# Patient Record
Sex: MALE | Race: WHITE | NOT HISPANIC OR LATINO | Employment: OTHER | URBAN - METROPOLITAN AREA
[De-identification: names, ages, dates, MRNs, and addresses within clinical notes are randomized per-mention and may not be internally consistent; named-entity substitution may affect disease eponyms.]

---

## 2020-12-18 ENCOUNTER — OFFICE VISIT (OUTPATIENT)
Dept: URGENT CARE | Facility: CLINIC | Age: 75
End: 2020-12-18
Payer: MEDICARE

## 2020-12-18 VITALS
OXYGEN SATURATION: 97 % | TEMPERATURE: 98 F | HEART RATE: 118 BPM | BODY MASS INDEX: 35.61 KG/M2 | WEIGHT: 235 LBS | HEIGHT: 68 IN | RESPIRATION RATE: 18 BRPM | SYSTOLIC BLOOD PRESSURE: 125 MMHG | DIASTOLIC BLOOD PRESSURE: 81 MMHG

## 2020-12-18 DIAGNOSIS — R50.9 FEVER, UNSPECIFIED FEVER CAUSE: Primary | ICD-10-CM

## 2020-12-18 LAB
CTP QC/QA: YES
SARS-COV-2 RDRP RESP QL NAA+PROBE: NEGATIVE

## 2020-12-18 PROCEDURE — 99203 PR OFFICE/OUTPT VISIT, NEW, LEVL III, 30-44 MIN: ICD-10-PCS | Mod: S$GLB,,, | Performed by: NURSE PRACTITIONER

## 2020-12-18 PROCEDURE — U0002 COVID-19 LAB TEST NON-CDC: HCPCS | Mod: QW,S$GLB,, | Performed by: NURSE PRACTITIONER

## 2020-12-18 PROCEDURE — 99203 OFFICE O/P NEW LOW 30 MIN: CPT | Mod: S$GLB,,, | Performed by: NURSE PRACTITIONER

## 2020-12-18 PROCEDURE — U0002: ICD-10-PCS | Mod: QW,S$GLB,, | Performed by: NURSE PRACTITIONER

## 2020-12-18 RX ORDER — CARVEDILOL 12.5 MG/1
TABLET ORAL
COMMUNITY
Start: 2020-10-20

## 2020-12-18 RX ORDER — PENICILLIN V POTASSIUM 500 MG/1
TABLET, FILM COATED ORAL
COMMUNITY
Start: 2020-12-18

## 2020-12-18 RX ORDER — AMLODIPINE BESYLATE 10 MG/1
TABLET ORAL
COMMUNITY
Start: 2020-10-27

## 2020-12-18 RX ORDER — LOSARTAN POTASSIUM 100 MG/1
TABLET ORAL
COMMUNITY
Start: 2020-10-27

## 2020-12-18 RX ORDER — ALLOPURINOL 300 MG/1
TABLET ORAL
COMMUNITY
Start: 2020-10-20

## 2020-12-18 RX ORDER — FINASTERIDE 5 MG/1
TABLET, FILM COATED ORAL
COMMUNITY
Start: 2020-11-19

## 2020-12-18 RX ORDER — HYDROCHLOROTHIAZIDE 12.5 MG/1
TABLET ORAL
COMMUNITY
Start: 2020-10-27

## 2020-12-18 RX ORDER — TAMSULOSIN HYDROCHLORIDE 0.4 MG/1
CAPSULE ORAL
COMMUNITY
Start: 2020-10-06

## 2020-12-18 RX ORDER — MUPIROCIN 20 MG/G
OINTMENT TOPICAL
COMMUNITY
Start: 2020-09-22

## 2020-12-18 NOTE — PROGRESS NOTES
"Subjective:       Patient ID: Zi Martin Jr. is a 75 y.o. male.    Vitals:  height is 5' 8" (1.727 m) and weight is 106.6 kg (235 lb). His temporal temperature is 97.7 °F (36.5 °C). His blood pressure is 125/81 and his pulse is 118 (abnormal). His respiration is 18 and oxygen saturation is 97%.     Chief Complaint: Fever    Patient just had recent dental work and he gets fevers from them.  States he has an antibiotic prescription waiting at the pharmacy, but wanted to rule out covid.      Fever   This is a new problem. The current episode started today. The problem occurs constantly. The problem has been gradually improving. The temperature was taken using an oral thermometer. Pertinent negatives include no congestion, coughing, ear pain, nausea, rash, sore throat, vomiting or wheezing. Treatments tried: penicillin 500mg  The treatment provided mild relief.       Constitution: Positive for chills and fever (100.5 today @ home). Negative for activity change, appetite change, sweating and fatigue.   HENT: Negative for ear pain, congestion, sinus pain, sinus pressure, sore throat and voice change.    Neck: Negative for painful lymph nodes.   Eyes: Negative for eye redness.   Respiratory: Negative for chest tightness, cough, sputum production, bloody sputum, COPD, shortness of breath, stridor, wheezing and asthma.    Gastrointestinal: Negative for nausea and vomiting.   Musculoskeletal: Negative for muscle ache.   Skin: Negative for rash.   Allergic/Immunologic: Negative for seasonal allergies and asthma.   Hematologic/Lymphatic: Negative for swollen lymph nodes.       Objective:      Physical Exam   Constitutional: He is oriented to person, place, and time. He appears well-developed. He is cooperative.  Non-toxic appearance. He does not appear ill. No distress.   HENT:   Head: Normocephalic and atraumatic.   Ears:   Right Ear: Hearing, tympanic membrane, external ear and ear canal normal.   Left Ear: Hearing, " tympanic membrane, external ear and ear canal normal.   Nose: No mucosal edema, rhinorrhea, purulent discharge, nasal deformity or congestion. No epistaxis. Right sinus exhibits no maxillary sinus tenderness and no frontal sinus tenderness. Left sinus exhibits no maxillary sinus tenderness and no frontal sinus tenderness.   Mouth/Throat: Uvula is midline, oropharynx is clear and moist and mucous membranes are normal. No oral lesions. No trismus in the jaw. Normal dentition. No uvula swelling or dental caries. No oropharyngeal exudate, posterior oropharyngeal edema or posterior oropharyngeal erythema.   Eyes: Conjunctivae and lids are normal. No scleral icterus.   Neck: Trachea normal, full passive range of motion without pain and phonation normal. Neck supple. No neck rigidity. No edema and no erythema present.   Cardiovascular: Normal rate, regular rhythm, normal heart sounds and normal pulses.   Pulmonary/Chest: Effort normal and breath sounds normal. No respiratory distress. He has no decreased breath sounds. He has no rhonchi.   Abdominal: Normal appearance.   Musculoskeletal: Normal range of motion.         General: No deformity.   Lymphadenopathy:     He has no cervical adenopathy.        Right cervical: No superficial cervical, no deep cervical and no posterior cervical adenopathy present.       Left cervical: No superficial cervical, no deep cervical and no posterior cervical adenopathy present.   Neurological: He is alert and oriented to person, place, and time. He exhibits normal muscle tone. Coordination normal.   Skin: Skin is warm, dry, intact, not diaphoretic and not pale. Psychiatric: His speech is normal and behavior is normal. Judgment and thought content normal.   Nursing note and vitals reviewed.        Assessment:       1. Fever, unspecified fever cause        Plan:       Results for orders placed or performed in visit on 12/18/20   POCT COVID-19 Rapid Screening   Result Value Ref Range    POC  "Rapid COVID Negative Negative     Acceptable Yes          Fever, unspecified fever cause  -     POCT COVID-19 Rapid Screening      Patient Instructions   Please follow up with your Primary care provider within 2-5 days if your signs and symptoms have not resolved or worsen.  The usual course of cold symptoms are 10-14 days.     If your condition worsens or fails to improve we recommend that you receive another evaluation at the emergency room immediately or contact your primary medical clinic to discuss your concerns.     You must understand that you have received an Urgent Care treatment only and that you may be released before all of your medical problems are known or treated.   You, the patient, will arrange for follow up care as instructed.     Tylenol or Ibuprofen can also be used as directed for pain/fever unless you have an allergy to them or medical condition such as stomach ulcers, kidney or liver disease or blood thinners etc for which you should not be taking these type of medications.     Take over the counter cough medication as directed as needed for cough.  You should avoid medications with pseudoephedrine or phenylephrine (any medication with "D") if you have high blood pressure as this can cause an elevation in your blood pressure. Instead consider Corcidin HBP as needed to prevent an elevated blood pressure.     Natural remedies of symptoms (as needed) include humidification, saline nasal sprays, and/or steamy showers.  Increase fluids, warm tea with honey, cough drops as needed.  You may also use salt water gargles for sore throat.    IF you received a oral steroid today - As discussed, this can elevate your blood pressure, elevate your blood sugar, water weight gain, nervous energy, redness to the face and dimpling of the skin at the injection site.                    "

## 2020-12-18 NOTE — PATIENT INSTRUCTIONS
"Please follow up with your Primary care provider within 2-5 days if your signs and symptoms have not resolved or worsen.  The usual course of cold symptoms are 10-14 days.     If your condition worsens or fails to improve we recommend that you receive another evaluation at the emergency room immediately or contact your primary medical clinic to discuss your concerns.     You must understand that you have received an Urgent Care treatment only and that you may be released before all of your medical problems are known or treated.   You, the patient, will arrange for follow up care as instructed.     Tylenol or Ibuprofen can also be used as directed for pain/fever unless you have an allergy to them or medical condition such as stomach ulcers, kidney or liver disease or blood thinners etc for which you should not be taking these type of medications.     Take over the counter cough medication as directed as needed for cough.  You should avoid medications with pseudoephedrine or phenylephrine (any medication with "D") if you have high blood pressure as this can cause an elevation in your blood pressure. Instead consider Corcidin HBP as needed to prevent an elevated blood pressure.     Natural remedies of symptoms (as needed) include humidification, saline nasal sprays, and/or steamy showers.  Increase fluids, warm tea with honey, cough drops as needed.  You may also use salt water gargles for sore throat.    IF you received a oral steroid today - As discussed, this can elevate your blood pressure, elevate your blood sugar, water weight gain, nervous energy, redness to the face and dimpling of the skin at the injection site.           "

## 2023-03-04 ENCOUNTER — OFFICE VISIT (OUTPATIENT)
Dept: URGENT CARE | Facility: CLINIC | Age: 78
End: 2023-03-04
Payer: MEDICARE

## 2023-03-04 VITALS
HEIGHT: 68 IN | TEMPERATURE: 98 F | DIASTOLIC BLOOD PRESSURE: 79 MMHG | BODY MASS INDEX: 35.31 KG/M2 | SYSTOLIC BLOOD PRESSURE: 138 MMHG | WEIGHT: 233 LBS | OXYGEN SATURATION: 96 % | RESPIRATION RATE: 16 BRPM | HEART RATE: 96 BPM

## 2023-03-04 DIAGNOSIS — M25.462 PAIN AND SWELLING OF KNEE, LEFT: Primary | ICD-10-CM

## 2023-03-04 DIAGNOSIS — R58 ECCHYMOSIS: ICD-10-CM

## 2023-03-04 DIAGNOSIS — M25.562 PAIN AND SWELLING OF KNEE, LEFT: Primary | ICD-10-CM

## 2023-03-04 DIAGNOSIS — S09.90XA TRAUMATIC INJURY OF HEAD, INITIAL ENCOUNTER: ICD-10-CM

## 2023-03-04 PROCEDURE — 3078F PR MOST RECENT DIASTOLIC BLOOD PRESSURE < 80 MM HG: ICD-10-PCS | Mod: CPTII,S$GLB,, | Performed by: EMERGENCY MEDICINE

## 2023-03-04 PROCEDURE — 1159F MED LIST DOCD IN RCRD: CPT | Mod: CPTII,S$GLB,, | Performed by: EMERGENCY MEDICINE

## 2023-03-04 PROCEDURE — 3078F DIAST BP <80 MM HG: CPT | Mod: CPTII,S$GLB,, | Performed by: EMERGENCY MEDICINE

## 2023-03-04 PROCEDURE — 99214 OFFICE O/P EST MOD 30 MIN: CPT | Mod: S$GLB,,, | Performed by: EMERGENCY MEDICINE

## 2023-03-04 PROCEDURE — 3075F PR MOST RECENT SYSTOLIC BLOOD PRESS GE 130-139MM HG: ICD-10-PCS | Mod: CPTII,S$GLB,, | Performed by: EMERGENCY MEDICINE

## 2023-03-04 PROCEDURE — 3075F SYST BP GE 130 - 139MM HG: CPT | Mod: CPTII,S$GLB,, | Performed by: EMERGENCY MEDICINE

## 2023-03-04 PROCEDURE — 1159F PR MEDICATION LIST DOCUMENTED IN MEDICAL RECORD: ICD-10-PCS | Mod: CPTII,S$GLB,, | Performed by: EMERGENCY MEDICINE

## 2023-03-04 PROCEDURE — 99214 PR OFFICE/OUTPT VISIT, EST, LEVL IV, 30-39 MIN: ICD-10-PCS | Mod: S$GLB,,, | Performed by: EMERGENCY MEDICINE

## 2023-03-04 RX ORDER — VALSARTAN 320 MG/1
320 TABLET ORAL
COMMUNITY
Start: 2023-02-20

## 2023-03-04 RX ORDER — APIXABAN 5 MG/1
5 TABLET, FILM COATED ORAL 2 TIMES DAILY
COMMUNITY
Start: 2023-01-25

## 2023-03-04 RX ORDER — CEPHALEXIN 500 MG/1
500 CAPSULE ORAL EVERY 8 HOURS
Qty: 30 CAPSULE | Refills: 0 | Status: SHIPPED | OUTPATIENT
Start: 2023-03-04 | End: 2023-03-14

## 2023-03-04 RX ORDER — CHOLECALCIFEROL (VITAMIN D3) 25 MCG
1000 TABLET ORAL DAILY
COMMUNITY

## 2023-03-04 RX ORDER — AZELASTINE 1 MG/ML
1 SPRAY, METERED NASAL
COMMUNITY
Start: 2022-10-28

## 2023-03-04 RX ORDER — PANTOPRAZOLE SODIUM 40 MG/1
40 TABLET, DELAYED RELEASE ORAL
COMMUNITY
Start: 2023-01-26

## 2023-03-04 NOTE — PROGRESS NOTES
"Subjective:       Patient ID: Zi Martin Jr. is a 77 y.o. male.    Vitals:  height is 5' 8" (1.727 m) and weight is 105.7 kg (233 lb). His oral temperature is 98.1 °F (36.7 °C). His blood pressure is 138/79 and his pulse is 96. His respiration is 16 and oxygen saturation is 96%.     Chief Complaint: left knee discoloration    Pt complains of bruising/left knee discoloration having a really bad fall about one week ago. Pt was seen in the ER where they performed an xray and cat scan. Pt is concerned about the discoloration and would like to make sure everything is okay.     PATIENT IS A 77-YEAR-OLD MALE ON ELIQUIS WHO HAD A FALL ABOUT A WEEK AGO WHERE HE WAS EVALUATED IN THE EMERGENCY DEPARTMENT FOR LEFT FOREHEAD CONTUSION HEMATOMA WITH NEGATIVE CT SCAN.  HE HAS HAD MIGRATION OF THE HEMATOMA AND ECCHYMOSIS TO BILATERAL PERIORBITAL REGION.  HE ALSO SUSTAINED A FALL WHEN INJURY TO THE LEFT KNEE WHERE HE HAD X-RAYS WHICH WERE NEGATIVE FOR ACUTE BONY INJURY.  THERE WAS SIGNIFICANT SWELLING AND ABRASIONS TO THE LEFT KNEE.  THEY ARE CONCERNED ABOUT THE MIGRATION OF THE BRUISING TO THE MEDIAL ASPECT OF THE THIGH KNEE AS WELL AS DOWN THE LEG.  THERE IS SOME WARMTH AND ERYTHEMA AT AN ABRASION SITE CONSISTENT WITH VERY MILD CELLULITIS AND INFLAMMATION.  HE DOES HAVE A HEMATOMA OF THE LEFT ANTERIOR KNEE.  HE HAS FULL RANGE OF MOTION.  TO NOTE HE HAS BEEN WALKING 3 MILES PER DAY SINCE THE INJURY AND NOT RESTING OR ICING ELEVATING.  EXPLAINED TO HIM THE IMPORTANCE OF ICING ELEVATING AND TO POSSIBLY NOT WALK 3 MILES PER DAY FOR THE NEXT 1 WEEK.  HE WILL CONTINUE TO TAKE TYLENOL IF NEEDED AND I HAVE REVIEWED THE CT SCAN OF THE BRAIN SHOWING NO ACUTE SKULL FRACTURE OR INTRACRANIAL INJURY AND X-RAY.  ENCOURAGED HIM TO SEE ORTHOPEDICS IF STILL HAVING PERSISTENT PAIN OR SWELLING TO THE LEFT KNEE IN 2 WEEKS.  PLACED ON KEFLEX FOR SUPERFICIAL CELLULITIS OF THE LEFT ANTERIOR KNEE.      ROS    Constitution: Negative for chills and " fever.   HENT:  Negative for postnasal drip, sinus pain and sore throat.    Neck: Negative for neck pain and neck stiffness.   Cardiovascular:  Negative for chest pain and palpitations.   Respiratory:  Negative for cough and shortness of breath.    Genitourinary:  Negative for dysuria and hematuria.   Musculoskeletal:  Negative for joint pain.   Skin:  Positive for color change, wound, abrasion, erythema and bruising. Negative for laceration.   Neurological:  Negative for dizziness, light-headedness, passing out, coordination disturbances, loss of balance, headaches, altered mental status, numbness and tingling.   Psychiatric/Behavioral:  Negative for altered mental status.          Objective:      Physical Exam   Constitutional: He is oriented to person, place, and time. He appears well-developed.   HENT:   Head: Normocephalic and atraumatic. Head is without abrasion, without contusion and without laceration.   Ears:   Right Ear: External ear normal.   Left Ear: External ear normal.   Nose: Nose normal.   Mouth/Throat: Oropharynx is clear and moist and mucous membranes are normal.   Eyes: Conjunctivae, EOM and lids are normal. Pupils are equal, round, and reactive to light.   Neck: Trachea normal and phonation normal. Neck supple.   Cardiovascular: Normal rate, regular rhythm and normal heart sounds.   Pulmonary/Chest: Effort normal and breath sounds normal. No stridor. No respiratory distress.   Musculoskeletal: Normal range of motion.         General: Swelling and signs of injury present. No tenderness. Normal range of motion.   Neurological: He is alert and oriented to person, place, and time.   Skin: Skin is warm, dry, intact and no rash. Capillary refill takes less than 2 seconds. bruising and erythema No abrasion, No burn and No ecchymosis         Comments: THERE IS A HEALING ABRASION TO THE LEFT DISTAL THIGH AND AN AREA OF ABRASION OF THE LEFT ANTERIOR KNEE WITH SLIGHT ERYTHEMA AND WARMTH CONSISTENT WITH  SUPERFICIAL EARLY CELLULITIS.  THERE IS ALSO BRUISING NOTED TO THE ANTEROMEDIAL KNEE AND THIGH PROGRESSING DISTALLY TO THE FOOT AND LEG CONSISTENT WITH DEPENDENT GRAVITY AND ECCHYMOSIS MIGRATING.  NO EXPANDING HEMATOMA AND CERTAINLY NO PULSATILE HEMATOMA..  SIMILAR AREA OF PERIORBITAL ECCHYMOSIS FROM MIGRATING CAUDALLY FROM THE FOREHEAD.   Psychiatric: His speech is normal and behavior is normal. Judgment and thought content normal.   Nursing note and vitals reviewed.         X-Ray Knee 3 View Left    Result Date: 2/25/2023  EXAMINATION: XR KNEE 3 VIEW LEFT CLINICAL HISTORY: Unspecified injury of unspecified lower leg, initial encounter TECHNIQUE: AP, lateral, and Merchant views of the left knee were performed. COMPARISON: None FINDINGS: There is osteopenia.  There is no acute fracture or dislocation of the left knee.  Alignment is normal.  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  There is prepatellar and infrapatellar soft tissue edema.  There is no joint effusion.     No acute osseous abnormality. Prepatellar and infrapatellar soft tissue edema. Tricompartmental osteoarthritis. Electronically signed by: Livan Carrillo Date:    02/25/2023 Time:    19:18    CT Head Without Contrast    Result Date: 2/25/2023  EXAMINATION: CT HEAD WITHOUT CONTRAST CLINICAL HISTORY: Head trauma, moderate-severe;Fall to ground from standing height, frontal hematoma, on Eliquis; TECHNIQUE: Low dose axial CT images obtained throughout the head without intravenous contrast. Sagittal and coronal reconstructions were performed. COMPARISON: None available. FINDINGS: Ventricles and sulci are normal in size for age without evidence of hydrocephalus. No extra-axial blood or fluid collections.  Small hypodensities in the right basal ganglia may represent a remote lacunar infarction or dilated perivascular space.  There is a remote lacunar infarction in the left thalamus.  There are mild chronic microvascular ischemic changes.  No parenchymal  mass, hemorrhage, edema or major vascular distribution infarct. There is a large left supraorbital and left frontal scalp soft tissue contusion.  Bilateral paranasal sinuses and mastoid air cells are clear.  Streak artifact from dental amalgam noted.     No acute intracranial abnormality. Large left supraorbital and left frontal scalp soft tissue contusion. 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 Electronically signed by: Livan Carrillo Date:    02/25/2023 Time:    19:21       Assessment:       1. Pain and swelling of knee, left    2. Traumatic injury of head, initial encounter    3. Ecchymosis          Plan:         Pain and swelling of knee, left    Traumatic injury of head, initial encounter    Ecchymosis    Other orders  -     cephALEXin (KEFLEX) 500 MG capsule; Take 1 capsule (500 mg total) by mouth every 8 (eight) hours. for 10 days  Dispense: 30 capsule; Refill: 0         Patient Instructions   Have reviewed the x-rays of the left knee and the CT scan of the head and brain which were negative for acute injury.      There will be continued migration of the bruising of the face and the left lower extremity.      Take it easy for 1 week without walking 3 miles per day and concentrate on resting, icing, elevating the left lower extremity.      Due to the redness over the site of the abrasion will prescribe oral antibiotic for 7 days     Follow-up with orthopedics in 2 weeks if symptoms not very much improved.

## 2023-03-04 NOTE — PATIENT INSTRUCTIONS
Have reviewed the x-rays of the left knee and the CT scan of the head and brain which were negative for acute injury.      There will be continued migration of the bruising of the face and the left lower extremity.      Take it easy for 1 week without walking 3 miles per day and concentrate on resting, icing, elevating the left lower extremity.      Due to the redness over the site of the abrasion will prescribe oral antibiotic for 7 days     Follow-up with orthopedics in 2 weeks if symptoms not very much improved.

## 2024-09-25 ENCOUNTER — OFFICE VISIT (OUTPATIENT)
Dept: URGENT CARE | Facility: CLINIC | Age: 79
End: 2024-09-25
Payer: MEDICARE

## 2024-09-25 VITALS
DIASTOLIC BLOOD PRESSURE: 82 MMHG | SYSTOLIC BLOOD PRESSURE: 142 MMHG | HEIGHT: 68 IN | BODY MASS INDEX: 35.31 KG/M2 | HEART RATE: 60 BPM | WEIGHT: 233 LBS | RESPIRATION RATE: 16 BRPM | OXYGEN SATURATION: 97 % | TEMPERATURE: 98 F

## 2024-09-25 DIAGNOSIS — K11.20 PAROTIDITIS: Primary | ICD-10-CM

## 2024-09-25 PROCEDURE — 99203 OFFICE O/P NEW LOW 30 MIN: CPT | Mod: S$GLB,,, | Performed by: PHYSICIAN ASSISTANT

## 2024-09-25 RX ORDER — HYDROCORTISONE 25 MG/G
CREAM TOPICAL 2 TIMES DAILY PRN
COMMUNITY
Start: 2024-05-07

## 2024-09-25 RX ORDER — TRIAMCINOLONE ACETONIDE 55 UG/1
2 SPRAY, METERED NASAL
COMMUNITY

## 2024-09-25 RX ORDER — METRONIDAZOLE 500 MG/1
TABLET ORAL
COMMUNITY

## 2024-09-25 RX ORDER — PREDNISOLONE ACETATE 10 MG/ML
SUSPENSION/ DROPS OPHTHALMIC
COMMUNITY

## 2024-09-25 RX ORDER — CETIRIZINE HYDROCHLORIDE 10 MG/1
CAPSULE, LIQUID FILLED ORAL
COMMUNITY

## 2024-09-25 RX ORDER — MECOBALAMIN 1000 MCG
TABLET,CHEWABLE ORAL
COMMUNITY

## 2024-09-25 RX ORDER — AMOXICILLIN AND CLAVULANATE POTASSIUM 875; 125 MG/1; MG/1
1 TABLET, FILM COATED ORAL EVERY 12 HOURS
Qty: 20 TABLET | Refills: 0 | Status: SHIPPED | OUTPATIENT
Start: 2024-09-25

## 2024-09-25 RX ORDER — KETOROLAC TROMETHAMINE 5 MG/ML
SOLUTION OPHTHALMIC
COMMUNITY

## 2024-09-25 RX ORDER — OFLOXACIN 3 MG/ML
SOLUTION/ DROPS OPHTHALMIC
COMMUNITY

## 2024-09-25 RX ORDER — VIT A/VIT C/VIT E/ZINC/COPPER 4296-226
CAPSULE ORAL
COMMUNITY

## 2024-09-26 NOTE — PROGRESS NOTES
"Subjective:      Patient ID: Zi Martin Jr. is a 79 y.o. male.    Vitals:  height is 5' 8" (1.727 m) and weight is 105.7 kg (233 lb). His oral temperature is 98.1 °F (36.7 °C). His blood pressure is 142/82 (abnormal) and his pulse is 60. His respiration is 16 and oxygen saturation is 97%.     Chief Complaint: Facial Pain    Patient presents with right facial swelling to cheek. Patient states that he noticed it tonight. Patient denies dental pain. No other symptoms reported.     Facial Pain  This is a new problem. The current episode started today. The problem occurs constantly. The problem has been unchanged. Pertinent negatives include no chills, diaphoresis, fatigue, fever, joint swelling, myalgias or nausea. Nothing aggravates the symptoms. He has tried nothing for the symptoms. The treatment provided no relief.     Constitution: Negative for chills, sweating, fatigue and fever.   Gastrointestinal:  Negative for nausea.   Musculoskeletal:  Negative for joint swelling and muscle ache.      Objective:     Physical Exam   Constitutional: He is oriented to person, place, and time. He appears well-developed.  Non-toxic appearance. He does not appear ill. No distress.   HENT:   Head: Normocephalic and atraumatic.       Ears:   Right Ear: External ear normal.   Left Ear: External ear normal.   Nose: Nose normal. No rhinorrhea or congestion.   Mouth/Throat: Mucous membranes are normal. No oropharyngeal exudate or posterior oropharyngeal erythema.    Right parotid swollen moderately but is not tender.      Comments:  Right parotid swollen moderately but is not tender.  Eyes: Conjunctivae and lids are normal. Right eye exhibits no discharge. Left eye exhibits no discharge. No scleral icterus.   Neck: Trachea normal. Neck supple.   Cardiovascular: Normal rate, regular rhythm and normal heart sounds.   Pulmonary/Chest: Effort normal. No respiratory distress.   Abdominal: Normal appearance. Soft. There is no abdominal " tenderness.   Musculoskeletal: Normal range of motion.         General: Normal range of motion.   Neurological: He is alert and oriented to person, place, and time. He has normal strength.   Skin: Skin is warm, dry, intact, not diaphoretic and not pale.   Psychiatric: His speech is normal and behavior is normal. Judgment and thought content normal.   Nursing note and vitals reviewed.      Assessment:     1. Parotiditis        Plan:       Parotiditis  -     amoxicillin-clavulanate 875-125mg (AUGMENTIN) 875-125 mg per tablet; Take 1 tablet by mouth every 12 (twelve) hours.  Dispense: 20 tablet; Refill: 0  -     Ambulatory referral/consult to ENT      Follow up if symptoms worsen or fail to improve, for F/U with PCP or ED. There are no Patient Instructions on file for this visit.

## 2024-11-23 ENCOUNTER — OFFICE VISIT (OUTPATIENT)
Dept: URGENT CARE | Facility: CLINIC | Age: 79
End: 2024-11-23
Payer: MEDICARE

## 2024-11-23 VITALS
HEART RATE: 103 BPM | BODY MASS INDEX: 36.66 KG/M2 | OXYGEN SATURATION: 95 % | RESPIRATION RATE: 20 BRPM | SYSTOLIC BLOOD PRESSURE: 141 MMHG | TEMPERATURE: 99 F | WEIGHT: 241.88 LBS | HEIGHT: 68 IN | DIASTOLIC BLOOD PRESSURE: 87 MMHG

## 2024-11-23 DIAGNOSIS — U07.1 COVID-19: Primary | ICD-10-CM

## 2024-11-23 DIAGNOSIS — C85.91 LYMPHOMA OF LYMPH NODES OF NECK, UNSPECIFIED LYMPHOMA TYPE: ICD-10-CM

## 2024-11-23 DIAGNOSIS — Z79.01 CURRENT USE OF LONG TERM ANTICOAGULATION: ICD-10-CM

## 2024-11-23 DIAGNOSIS — U07.1 COVID-19 VIRUS DETECTED: ICD-10-CM

## 2024-11-23 DIAGNOSIS — R05.9 COUGH, UNSPECIFIED TYPE: ICD-10-CM

## 2024-11-23 DIAGNOSIS — I10 HYPERTENSION, UNSPECIFIED TYPE: ICD-10-CM

## 2024-11-23 LAB
CTP QC/QA: YES
POC MOLECULAR INFLUENZA A AGN: NEGATIVE
POC MOLECULAR INFLUENZA B AGN: NEGATIVE
SARS-COV-2 AG RESP QL IA.RAPID: NEGATIVE
SARS-COV-2 RDRP RESP QL NAA+PROBE: POSITIVE

## 2024-11-23 PROCEDURE — 87811 SARS-COV-2 COVID19 W/OPTIC: CPT | Mod: QW,S$GLB,, | Performed by: PHYSICIAN ASSISTANT

## 2024-11-23 PROCEDURE — 99214 OFFICE O/P EST MOD 30 MIN: CPT | Mod: 25,S$GLB,, | Performed by: PHYSICIAN ASSISTANT

## 2024-11-23 PROCEDURE — 87502 INFLUENZA DNA AMP PROBE: CPT | Mod: QW,S$GLB,, | Performed by: PHYSICIAN ASSISTANT

## 2024-11-23 RX ORDER — HYDROCODONE BITARTRATE AND ACETAMINOPHEN 5; 325 MG/1; MG/1
1 TABLET ORAL EVERY 6 HOURS PRN
COMMUNITY
Start: 2024-11-13

## 2024-11-23 RX ORDER — CEPHALEXIN 500 MG/1
500 CAPSULE ORAL 2 TIMES DAILY
COMMUNITY
Start: 2024-11-13

## 2024-11-23 RX ORDER — MULTIVIT WITH MINERALS/HERBS
1 TABLET ORAL DAILY
COMMUNITY

## 2024-11-23 NOTE — PROGRESS NOTES
"Subjective:      Patient ID: Zi Martin Jr. is a 79 y.o. male.    Vitals:  height is 5' 8" (1.727 m) and weight is 109.7 kg (241 lb 13.5 oz). His oral temperature is 98.5 °F (36.9 °C). His blood pressure is 141/87 (abnormal) and his pulse is 103. His respiration is 20 and oxygen saturation is 95%.     Chief Complaint: Sinus Problem    Pt complains of cough, nasal congestion, and fatigue that started yesterday. Today he had a temp of 99.3. His wife and daughter both tested positive for covid on a home test but he has been testing negative.     Patient provider note starts here:    Patient presents in the clinic with his daughter with complaints of cough, nasal congestion and generalized malaise which all started yesterday.  He reports that both his daughter in his wife tested positive for COVID however, the patient's home COVID test was negative.  He denies chest pain or shortness of breath.    Sinus Problem  This is a new problem. The current episode started yesterday. The problem has been gradually worsening since onset. Associated symptoms include chills, congestion, coughing and sinus pressure. Pertinent negatives include no headaches, neck pain, shortness of breath or sore throat. Treatments tried: tylenol, guafinacine.       Constitution: Positive for chills, fatigue and fever (highest temp 99.3).   HENT:  Positive for congestion, sinus pain and sinus pressure. Negative for sore throat.    Neck: Negative for neck pain and neck stiffness.   Cardiovascular:  Negative for chest pain.   Respiratory:  Positive for cough and sputum production. Negative for chest tightness, shortness of breath and wheezing.    Gastrointestinal:  Positive for diarrhea. Negative for abdominal pain, nausea and vomiting.   Musculoskeletal:  Positive for muscle ache. Negative for pain.   Skin:  Negative for rash and wound.   Allergic/Immunologic: Negative for itching.   Neurological:  Negative for headaches, numbness and tingling.    "   Objective:     Physical Exam   Constitutional: He is oriented to person, place, and time. He appears well-developed. He is cooperative.  Non-toxic appearance. He appears ill. No distress.   HENT:   Head: Normocephalic and atraumatic.   Ears:   Right Ear: Hearing, tympanic membrane, external ear and ear canal normal.   Left Ear: Hearing, tympanic membrane, external ear and ear canal normal.   Nose: Congestion present. No mucosal edema, rhinorrhea or nasal deformity. No epistaxis. Right sinus exhibits no maxillary sinus tenderness and no frontal sinus tenderness. Left sinus exhibits no maxillary sinus tenderness and no frontal sinus tenderness.   Mouth/Throat: Uvula is midline, oropharynx is clear and moist and mucous membranes are normal. No trismus in the jaw. Normal dentition. No uvula swelling. No oropharyngeal exudate, posterior oropharyngeal edema or posterior oropharyngeal erythema.   Eyes: Conjunctivae and lids are normal. No scleral icterus.   Neck: Trachea normal and phonation normal. Neck supple. No edema present. No erythema present. No neck rigidity present.   Cardiovascular: Normal rate, regular rhythm, normal heart sounds and normal pulses.   Pulmonary/Chest: Effort normal and breath sounds normal. No respiratory distress. He has no decreased breath sounds. He has no wheezes. He has no rhonchi.   Abdominal: Normal appearance.   Musculoskeletal: Normal range of motion.         General: No deformity. Normal range of motion.   Neurological: He is alert and oriented to person, place, and time. He exhibits normal muscle tone. Coordination normal.   Skin: Skin is warm, dry, intact, not diaphoretic and not pale.   Psychiatric: His speech is normal and behavior is normal. Judgment and thought content normal.   Nursing note and vitals reviewed.      Assessment:     1. COVID-19    2. Cough, unspecified type    3. Hypertension, unspecified type    4. Current use of long term anticoagulation    5. Lymphoma of  lymph nodes of neck, unspecified lymphoma type      Results for orders placed or performed in visit on 11/23/24   SARS Coronavirus 2 Antigen, POCT Manual Read    Collection Time: 11/23/24  5:51 PM   Result Value Ref Range    SARS Coronavirus 2 Antigen Negative Negative     Acceptable Yes    POCT COVID-19 Rapid Screening    Collection Time: 11/23/24  5:58 PM   Result Value Ref Range    POC Rapid COVID Positive (A) Negative     Acceptable Yes    POCT Influenza A/B MOLECULAR    Collection Time: 11/23/24  5:58 PM   Result Value Ref Range    POC Molecular Influenza A Ag Negative Negative    POC Molecular Influenza B Ag Negative Negative     Acceptable Yes        Plan:       COVID-19  -     Discontinue: molnupiravir 200 mg capsule (EUA); Take 4 capsules (800 mg total) by mouth every 12 (twelve) hours. for 5 days  Dispense: 40 capsule; Refill: 0  -     molnupiravir 200 mg capsule (EUA); Take 4 capsules (800 mg total) by mouth every 12 (twelve) hours. for 5 days  Dispense: 40 capsule; Refill: 0  -     POCT COVID-19 Rapid Screening    Cough, unspecified type  -     SARS Coronavirus 2 Antigen, POCT Manual Read  -     POCT Influenza A/B MOLECULAR    Hypertension, unspecified type    Current use of long term anticoagulation    Lymphoma of lymph nodes of neck, unspecified lymphoma type          Medical Decision Making:   History:   I obtained history from: someone other than patient.  Old Medical Records: I decided to obtain old medical records.  Old Records Summarized: records from clinic visits.  Clinical Tests:   Lab Tests: Ordered and Reviewed  Urgent Care Management:  A. Problem List:   -Acute: COVID 19    -Chronic: CKD, HTN, long term current use of anticoagulant   B. Differential diagnosis: viral vs bacterial URI, pharyngitis, otitis, COVID 19, influenza, pneumonia  C. Diagnostic Testing Ordered: COVID (antigen and molecular), Flu   D. Diagnostic Testing Considered: None  E.  Independent Historians: Daughter   F. Urgent Care Midlevel Independent Results Interpretation: COVID antigen positive (very faint line), Flu negative, COVID molecular positive   G. Radiology:  H. Review of Previous Medical Records: Recent GFR was reduced (73)  I. Home Medications Reviewed  J. Social Determinants of Health considered  K. Medical Decision Making and Disposition:   Patient presents to the clinic with his daughter with COVID like symptoms in the setting of being closely exposed to COVID-19.  On exam, he is afebrile and nontoxic in appearance.  His lungs are clear to auscultation bilaterally and his vital signs are relatively stable.  His molecular COVID test is positive.  Due to the use of long-term anticoagulants, patient unable to take Paxlovid and I have prescribed Molnupiravir.  Further symptomatic treatment, ED precautions and isolation requirements discussed at length with patient and his daughter.  They both verbalized understanding and agreed with this plan.         Patient Instructions   Preventing Spread of Respiratory Viruses When You're Sick    When you may have a respiratory virus...  Stay home and away from others *including people you live with who are not sick) if you have respiratory virus symptoms that are not better explained by another cause. These symptoms including fever, chills, fatigue, cough, runny nose, and headache.      * You can go back to your normal activities when, for at least 24 hours, both are true:     - Your symptoms are better overall AND     - You have not had a fever (and are not using fever-reducing medication)    * When you go back to your normal activities, take added precaution over the next 5 days, such as taking additional steps for  air, hygiene, masks, physical distancing, and/or testing when you will be around other people indoors.   - Keep in mind that you may still be able to spread the virus that made you sick, even if you are feeling better.  You are likely to be less contagious at this time, depending on factors like how long you were sick or how sick you were.   - If you develop a fever or you start to feel worse after you have gone back to normal activities, stay home and away from other again until, for at least 24 hours, both are true: your symptoms are improving overall, and you have not had a fever (and are not using fever-reducing medications). Then take added precaution for the next 5 days.       If you never had symptoms but tested positive for a respiratory virus...  You may be contagious. For the next 5 days: take added precautions, such as taking additional steps for  air, hygiene, masks, physical distancing, and/or testing when you will be around other people indoors. This is especially important to protect people with factors that increase their risk of severe illness from respiratory viruses.       How it works...  When you have a respiratory virus infection, you can spread it to others. How long someone can spread the virus depends on different factors, including how sick they are (severity) and how long their illness lasts (duration). This is not the same for everyone.     When, for at least 24 hours, your symptoms are getting better overall and you have not had a fever (and are not using fever-reducing medications), you are typically less contagious, but it still take more time for your body to fully get rid of the virus. During this time, you may still be able to spread the virus to others. Taking precautions for the next 5 days can help reduce this risk. After this 5-day period, you are typically much less likely to be contagious. However, some people, especially people with weakened immune systems, can continue to spread the virus for longer periods of time.     Reference: About Preventing Spread of Respiratory Viruses When Youre Sick  Respiratory Illnesses  CDC

## 2024-12-11 ENCOUNTER — OFFICE VISIT (OUTPATIENT)
Dept: URGENT CARE | Facility: CLINIC | Age: 79
End: 2024-12-11
Payer: MEDICARE

## 2024-12-11 VITALS
DIASTOLIC BLOOD PRESSURE: 75 MMHG | TEMPERATURE: 100 F | BODY MASS INDEX: 36.66 KG/M2 | OXYGEN SATURATION: 95 % | HEART RATE: 90 BPM | WEIGHT: 241.88 LBS | SYSTOLIC BLOOD PRESSURE: 130 MMHG | HEIGHT: 68 IN | RESPIRATION RATE: 18 BRPM

## 2024-12-11 DIAGNOSIS — J06.9 VIRAL URI WITH COUGH: Primary | ICD-10-CM

## 2024-12-11 LAB
CTP QC/QA: YES
POC MOLECULAR INFLUENZA A AGN: NEGATIVE
POC MOLECULAR INFLUENZA B AGN: NEGATIVE

## 2024-12-11 PROCEDURE — 99213 OFFICE O/P EST LOW 20 MIN: CPT | Mod: S$GLB,,,

## 2024-12-11 PROCEDURE — 87502 INFLUENZA DNA AMP PROBE: CPT | Mod: QW,S$GLB,,

## 2024-12-11 RX ORDER — BENZONATATE 200 MG/1
200 CAPSULE ORAL 3 TIMES DAILY PRN
Qty: 30 CAPSULE | Refills: 0 | Status: SHIPPED | OUTPATIENT
Start: 2024-12-11 | End: 2024-12-21

## 2024-12-11 NOTE — PROGRESS NOTES
"Subjective:      Patient ID: Zi Martin Jr. is a 79 y.o. male.    Vitals:  height is 5' 8" (1.727 m) and weight is 109.7 kg (241 lb 13.5 oz). His oral temperature is 99.5 °F (37.5 °C). His blood pressure is 130/75 and his pulse is 90. His respiration is 18 and oxygen saturation is 95%.     Chief Complaint: Cough    79-year-old male here for congestion, postnasal drip, coughing, fever, body aches that started last night.  He did have COVID 3 weeks ago but symptoms only lasted for 2-3 days before complete recovery.  Denies nausea, vomiting, diarrhea, chest pain, SOB.    Cough  This is a new problem. The current episode started yesterday. The problem has been gradually worsening. The problem occurs constantly. The cough is Productive of sputum. Associated symptoms include a fever, myalgias and postnasal drip. Pertinent negatives include no chest pain, headaches, sore throat or shortness of breath. The symptoms are aggravated by lying down. Treatments tried: tylenol. There is no history of asthma, bronchitis or pneumonia.       Constitution: Positive for fever.   HENT:  Positive for congestion and postnasal drip. Negative for sore throat.    Neck: Negative for neck pain and neck stiffness.   Cardiovascular:  Negative for chest pain.   Respiratory:  Positive for cough. Negative for chest tightness, sputum production and shortness of breath.    Gastrointestinal:  Negative for abdominal pain, nausea, vomiting and diarrhea.   Musculoskeletal:  Positive for muscle ache.   Neurological:  Negative for dizziness and headaches.      Objective:     Physical Exam   Constitutional: He is oriented to person, place, and time. He appears well-developed.   HENT:   Head: Normocephalic and atraumatic.   Ears:   Right Ear: Tympanic membrane, external ear and ear canal normal.   Left Ear: Tympanic membrane, external ear and ear canal normal.   Nose: Congestion present.   Mouth/Throat: Oropharynx is clear and moist. Mucous membranes are " moist. Oropharynx is clear.   Eyes: Conjunctivae, EOM and lids are normal. Pupils are equal, round, and reactive to light.   Neck: Trachea normal and phonation normal. Neck supple.   Cardiovascular: Normal rate and regular rhythm.   Pulmonary/Chest: Effort normal and breath sounds normal. No respiratory distress.   Musculoskeletal: Normal range of motion.         General: Normal range of motion.   Neurological: no focal deficit. He is alert and oriented to person, place, and time.   Skin: Skin is warm, dry and intact.   Psychiatric: His speech is normal and behavior is normal. Judgment and thought content normal.   Nursing note and vitals reviewed.    Results for orders placed or performed in visit on 12/11/24   POCT Influenza A/B MOLECULAR    Collection Time: 12/11/24 11:01 AM   Result Value Ref Range    POC Molecular Influenza A Ag Negative Negative    POC Molecular Influenza B Ag Negative Negative     Acceptable Yes          Assessment:     1. Viral URI with cough        Plan:       Viral URI with cough  -     POCT Influenza A/B MOLECULAR  -     benzonatate (TESSALON) 200 MG capsule; Take 1 capsule (200 mg total) by mouth 3 (three) times daily as needed for Cough.  Dispense: 30 capsule; Refill: 0              Patient Instructions   - Rest.    - Drink plenty of fluids.  - Viral upper respiratory infections typically run their course in 10-14 days.      - You can take over-the-counter claritin, zyrtec, allegra, or xyzal as directed. These are antihistamines that can help with runny nose, nasal congestion, sneezing, and helps to dry up post-nasal drip, which usually causes sore throat and cough.              - If you do NOT have high blood pressure, you may use a decongestant form (D)  of this medication (ie. Claritin- D, zyrtec-D, allegra-D) or if you do not take the D form, you can take sudafed (pseudoephedrine) over the counter, which is a decongestant. Do NOT take two decongestant (D) medications  at the same time (such as mucinex-D and claritin-D or plain sudafed and claritin D)    - If you DO have Hypertension, anxiety, or palpitations, it is safe to take Coricidin HBP for relief of sinus symptoms.     - You can use Flonase (fluticasone) nasal spray as directed for sinus congestion and postnasal drip. This is a steroid nasal spray that works locally over time to decrease the inflammation in your nose/sinuses and help with allergic symptoms. This is not an quick- relief spray like afrin, but it works well if used daily.  Discontinue if you develop nose bleed  - use nasal saline prior to Flonase.  - Use Ocean Spray Nasal Saline 1-3 puffs each nostril every 2-3 hours then blow out onto tissue. This is to irrigate the nasal passage way to clear the sinus openings. Use until sinus problem resolved.     - you can take plain Mucinex (guaifenesin) 1200 mg twice a day to help loosen mucous.      -warm salt water gargles can help with sore throat     - warm tea with honey can help with cough. Honey is a natural cough suppressant.     - Dextromethorphan (DM) is a cough suppressant over the counter (ie. mucinex DM, robitussin, delsym; dayquil/nyquil has DM as well.)        - Follow up with your PCP or specialty clinic as directed in the next 1-2 weeks if not improved or as needed.  You can call [phone] to schedule an appointment with the appropriate provider.       - Go to the ER if you develop new or worsening symptoms.      - You must understand that you have received an Urgent Care treatment only and that you may be released before all of your medical problems are known or treated.   - You, the patient, will arrange for follow up care as instructed.   - If your condition worsens or fails to improve we recommend that you receive another evaluation at the ER immediately or contact your PCP to discuss your concerns or return here.

## 2024-12-11 NOTE — PATIENT INSTRUCTIONS

## 2025-03-03 PROBLEM — Z74.09 IMPAIRED FUNCTIONAL MOBILITY, BALANCE, GAIT, AND ENDURANCE: Status: ACTIVE | Noted: 2025-03-03

## 2025-03-03 PROBLEM — R29.898 DECREASED STRENGTH OF LOWER EXTREMITY: Status: ACTIVE | Noted: 2025-03-03

## 2025-04-22 DIAGNOSIS — M47.816 SPONDYLOARTHROPATHY OF LUMBAR SPINE: Primary | ICD-10-CM
